# Patient Record
Sex: MALE | Race: WHITE
[De-identification: names, ages, dates, MRNs, and addresses within clinical notes are randomized per-mention and may not be internally consistent; named-entity substitution may affect disease eponyms.]

---

## 2018-02-23 ENCOUNTER — HOSPITAL ENCOUNTER (EMERGENCY)
Dept: HOSPITAL 54 - ER | Age: 23
Discharge: HOME | End: 2018-02-23
Payer: SELF-PAY

## 2018-02-23 VITALS — SYSTOLIC BLOOD PRESSURE: 132 MMHG | DIASTOLIC BLOOD PRESSURE: 84 MMHG

## 2018-02-23 VITALS — WEIGHT: 165 LBS | HEIGHT: 70 IN | BODY MASS INDEX: 23.62 KG/M2

## 2018-02-23 DIAGNOSIS — F15.10: Primary | ICD-10-CM

## 2018-02-23 LAB
ALBUMIN SERPL BCP-MCNC: 4.5 G/DL (ref 3.4–5)
ALP SERPL-CCNC: 98 U/L (ref 46–116)
ALT SERPL W P-5'-P-CCNC: 40 U/L (ref 12–78)
APAP SERPL-MCNC: < 2 UG/ML (ref 10–30)
AST SERPL W P-5'-P-CCNC: 33 U/L (ref 15–37)
BASOPHILS # BLD AUTO: 0 /CMM (ref 0–0.2)
BASOPHILS NFR BLD AUTO: 0.1 % (ref 0–2)
BILIRUB DIRECT SERPL-MCNC: 0.2 MG/DL (ref 0–0.2)
BILIRUB SERPL-MCNC: 0.7 MG/DL (ref 0.2–1)
BUN SERPL-MCNC: 12 MG/DL (ref 7–18)
CALCIUM SERPL-MCNC: 9.7 MG/DL (ref 8.5–10.1)
CHLORIDE SERPL-SCNC: 99 MMOL/L (ref 98–107)
CO2 SERPL-SCNC: 25 MMOL/L (ref 21–32)
CREAT SERPL-MCNC: 1.2 MG/DL (ref 0.6–1.3)
EOSINOPHIL # BLD AUTO: 0.1 /CMM (ref 0–0.7)
EOSINOPHIL NFR BLD AUTO: 0.6 % (ref 0–6)
ETHANOL SERPL-MCNC: < 3 MG/DL (ref 0–0)
GLUCOSE SERPL-MCNC: 101 MG/DL (ref 74–106)
HCT VFR BLD AUTO: 47 % (ref 39–51)
HGB BLD-MCNC: 16.4 G/DL (ref 13.5–17.5)
LYMPHOCYTES NFR BLD AUTO: 2.4 /CMM (ref 0.8–4.8)
LYMPHOCYTES NFR BLD AUTO: 22.7 % (ref 20–44)
MCH RBC QN AUTO: 31 PG (ref 26–33)
MCHC RBC AUTO-ENTMCNC: 35 G/DL (ref 31–36)
MCV RBC AUTO: 89 FL (ref 80–96)
MONOCYTES NFR BLD AUTO: 0.3 /CMM (ref 0.1–1.3)
MONOCYTES NFR BLD AUTO: 2.6 % (ref 2–12)
NEUTROPHILS # BLD AUTO: 7.7 /CMM (ref 1.8–8.9)
NEUTROPHILS NFR BLD AUTO: 74 % (ref 43–81)
PLATELET # BLD AUTO: 208 /CMM (ref 150–450)
POTASSIUM SERPL-SCNC: 3.2 MMOL/L (ref 3.5–5.1)
PROT SERPL-MCNC: 8.2 G/DL (ref 6.4–8.2)
RBC # BLD AUTO: 5.25 MIL/UL (ref 4.5–6)
RDW COEFFICIENT OF VARIATION: 12.5 (ref 11.5–15)
SALICYLATES SERPL-MCNC: 2.9 MG/DL (ref 2.8–20)
SODIUM SERPL-SCNC: 138 MMOL/L (ref 136–145)
WBC NRBC COR # BLD AUTO: 10.5 K/UL (ref 4.3–11)

## 2018-02-23 PROCEDURE — G0480 DRUG TEST DEF 1-7 CLASSES: HCPCS

## 2018-02-23 PROCEDURE — 80329 ANALGESICS NON-OPIOID 1 OR 2: CPT

## 2018-02-23 PROCEDURE — A4606 OXYGEN PROBE USED W OXIMETER: HCPCS

## 2018-02-23 PROCEDURE — Z7610: HCPCS

## 2018-02-23 PROCEDURE — 85025 COMPLETE CBC W/AUTO DIFF WBC: CPT

## 2018-02-23 PROCEDURE — 99284 EMERGENCY DEPT VISIT MOD MDM: CPT

## 2018-02-23 PROCEDURE — 36415 COLL VENOUS BLD VENIPUNCTURE: CPT

## 2018-02-23 PROCEDURE — 80048 BASIC METABOLIC PNL TOTAL CA: CPT

## 2018-02-23 PROCEDURE — 80076 HEPATIC FUNCTION PANEL: CPT

## 2018-02-23 NOTE — NUR
PT ALEX TO ER BED 12. HERE FOR AMS S/P METH USE. APPEARS ANXIOUS. STABLE 
VITALS AWAITING MD MORRIS.

## 2018-02-26 ENCOUNTER — HOSPITAL ENCOUNTER (INPATIENT)
Dept: HOSPITAL 54 - ER | Age: 23
LOS: 2 days | Discharge: LEFT BEFORE DISCHARGE | DRG: 917 | End: 2018-02-28
Attending: INTERNAL MEDICINE | Admitting: INTERNAL MEDICINE
Payer: SELF-PAY

## 2018-02-26 VITALS — HEIGHT: 69 IN | BODY MASS INDEX: 26.51 KG/M2 | WEIGHT: 179 LBS

## 2018-02-26 DIAGNOSIS — F14.10: ICD-10-CM
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
## 2018-02-26 DIAGNOSIS — F15.159: ICD-10-CM

## 2018-02-26 DIAGNOSIS — F12.10: ICD-10-CM

## 2018-02-26 DIAGNOSIS — F29: ICD-10-CM

## 2018-02-26 DIAGNOSIS — T50.991A: Primary | ICD-10-CM

## 2018-02-26 DIAGNOSIS — F13.10: ICD-10-CM

## 2018-02-26 DIAGNOSIS — G92: ICD-10-CM

## 2018-02-26 LAB
ALBUMIN SERPL BCP-MCNC: 3.9 G/DL (ref 3.4–5)
ALP SERPL-CCNC: 91 U/L (ref 46–116)
ALT SERPL W P-5'-P-CCNC: 34 U/L (ref 12–78)
APAP SERPL-MCNC: 0 UG/ML (ref 10–30)
AST SERPL W P-5'-P-CCNC: 25 U/L (ref 15–37)
BASOPHILS # BLD AUTO: 0 /CMM (ref 0–0.2)
BASOPHILS NFR BLD AUTO: 0.6 % (ref 0–2)
BILIRUB DIRECT SERPL-MCNC: 0.2 MG/DL (ref 0–0.2)
BILIRUB SERPL-MCNC: 1 MG/DL (ref 0.2–1)
BUN SERPL-MCNC: 12 MG/DL (ref 7–18)
CALCIUM SERPL-MCNC: 8.9 MG/DL (ref 8.5–10.1)
CHLORIDE SERPL-SCNC: 104 MMOL/L (ref 98–107)
CO2 SERPL-SCNC: 26 MMOL/L (ref 21–32)
CREAT SERPL-MCNC: 0.9 MG/DL (ref 0.6–1.3)
EOSINOPHIL # BLD AUTO: 0.1 /CMM (ref 0–0.7)
EOSINOPHIL NFR BLD AUTO: 1.5 % (ref 0–6)
ETHANOL SERPL-MCNC: < 3 MG/DL (ref 0–0)
GLUCOSE SERPL-MCNC: 85 MG/DL (ref 74–106)
HCT VFR BLD AUTO: 43 % (ref 39–51)
HGB BLD-MCNC: 15.2 G/DL (ref 13.5–17.5)
LYMPHOCYTES NFR BLD AUTO: 2.1 /CMM (ref 0.8–4.8)
LYMPHOCYTES NFR BLD AUTO: 40.6 % (ref 20–44)
MCH RBC QN AUTO: 32 PG (ref 26–33)
MCHC RBC AUTO-ENTMCNC: 35 G/DL (ref 31–36)
MCV RBC AUTO: 90 FL (ref 80–96)
MONOCYTES NFR BLD AUTO: 0.6 /CMM (ref 0.1–1.3)
MONOCYTES NFR BLD AUTO: 11.3 % (ref 2–12)
NEUTROPHILS # BLD AUTO: 2.3 /CMM (ref 1.8–8.9)
NEUTROPHILS NFR BLD AUTO: 46 % (ref 43–81)
PH UR STRIP: 7 [PH] (ref 5–8)
PLATELET # BLD AUTO: 169 /CMM (ref 150–450)
POTASSIUM SERPL-SCNC: 3.9 MMOL/L (ref 3.5–5.1)
PROT SERPL-MCNC: 7.1 G/DL (ref 6.4–8.2)
RBC # BLD AUTO: 4.82 MIL/UL (ref 4.5–6)
RBC #/AREA URNS HPF: (no result) /HPF (ref 0–2)
RDW COEFFICIENT OF VARIATION: 12.8 (ref 11.5–15)
SALICYLATES SERPL-MCNC: 2.4 MG/DL (ref 2.8–20)
SODIUM SERPL-SCNC: 141 MMOL/L (ref 136–145)
UROBILINOGEN UR STRIP-MCNC: 1 EU/DL
WBC #/AREA URNS HPF: (no result) /HPF (ref 0–3)
WBC NRBC COR # BLD AUTO: 5.1 K/UL (ref 4.3–11)

## 2018-02-26 PROCEDURE — Z7610: HCPCS

## 2018-02-26 PROCEDURE — A4606 OXYGEN PROBE USED W OXIMETER: HCPCS

## 2018-02-26 PROCEDURE — G0480 DRUG TEST DEF 1-7 CLASSES: HCPCS

## 2018-02-26 NOTE — NUR
PT BECOMING RESTLESSNESS, AND AGGRESSIVE TOWARDS THE STAFF. ZYPREXA 10MG GIVEN 
IM INSTEAD OF ZYPREXA PO.
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
## 2018-02-26 NOTE — NUR
contacted [facility] [place] [phone] and spoke with 
Veronique. HARPREET informed Veronique rainey's situation and gave his name and phone number.  
Veronique informed HARPREET the consulate is in a meeting but will have someone call HARPREET. HARPREET gave 
Veronique her contact number and told her to call [phone] after 4:3-PM and ask for 
the charge nurse. HARPREET [facility].  

-------------------------------------------------------------------------------

Addendum: 02/26/18 at 1547 by YAMILEX MULLINS

-------------------------------------------------------------------------------

*after 4:30PM

## 2018-02-26 NOTE — NUR
TIRT[address] FROM STRE[address]: BIZARRE BEHAVIOR S/P USING METHAMPHETAMINES, PT WAS SEEN 
BY MD, NO FURTHER ORDERS GIVEN.

## 2018-02-27 VITALS — DIASTOLIC BLOOD PRESSURE: 63 MMHG | SYSTOLIC BLOOD PRESSURE: 100 MMHG

## 2018-02-27 VITALS — DIASTOLIC BLOOD PRESSURE: 88 MMHG | SYSTOLIC BLOOD PRESSURE: 120 MMHG

## 2018-02-27 VITALS — DIASTOLIC BLOOD PRESSURE: 58 MMHG | SYSTOLIC BLOOD PRESSURE: 99 MMHG

## 2018-02-27 VITALS — DIASTOLIC BLOOD PRESSURE: 57 MMHG | SYSTOLIC BLOOD PRESSURE: 120 MMHG

## 2018-02-27 NOTE — NUR
MS RN NOTES



PATIENT RECEIVED RESTING INSIDE ROOM, SLEEPING IN AND OUT. VERBALLY RESPONSIVE AND RESPONDS 
TO VERBAL AND TACTILE STIMULI. MUMBLES AUDIBLE WORDS BUT IN MIXED ENGLISH AND FOREIGN 
LANGUAGE. PATIENT CALM AT THIS TIME. WILL CONTINUE TO MONITOR. BED LOCKED AND IN LOW 
POSITION. BILATERAL UPPER SIDE RAILS UP AND LOCKED. CALL LIGHT WITHIN EASY REACH.

## 2018-02-27 NOTE — NUR
MS RN NOTES



PATIENT RESTING INSIDE ROOM, SLEEPING BUT AROUSABLE THROUGH VERBAL AND TACTILE STIMULI, THEN 
WOULD GO BACK TO SLEEP. PATIENT BREATHING EVEN AND UNLABORED. NO SOB OR ACUTE DISTRESS NOTED 
AT THIS TIME. PATIENT AFEBRILE, SKIN DRY AND WARM TO TOUCH. NO CHANGES IN LOC NOTED AT THIS 
TIME. WILL CONTINUE TO MONITOR. BILATERAL UPPER SIDE RAILS UP AND LOCKED. BED LOCKED AND IN 
LOW POSITION. BED ALARM ON. WILL ENDORSE TO INCOMING SHIFT
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
## 2018-02-27 NOTE — NUR
ms/rn opening notes

RECEIVED PATIENT, ASLEEP, RESTING COMFORTABLY IN BED, RESPIRATIONS EVEN AND UNLABORED, WITH 
SITTER TO MONITOR BEHAVIOR, REFUSE IV RE INSERTION REPORTED BY AM RN, RECEIVED REPORT FROM 
AM RN FOR CESIA. CALL LIGHTS WITHIN REACH, BED IN LOCK POSITION. SKIN WARM TO TOUCH, WILL 
PROVIDE FLUID AND KEEP COMFORTABLE.
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
## 2018-02-27 NOTE — NUR
MS RN NOTES



[place] CONSUL REPRESENTATIVE, JET MARCUS, PRESENT AT UNIT. PATIENT SEEN. VERBALIZED 
THAT THEY WILL CONTACT PATIENT'S FAMILY IN [place] TO COME AND SEE PATIENT. WILL CONTINUE TO 
MONITOR

## 2018-02-27 NOTE — NUR
MS RN NOTES



PATIENT SLEEPING INSIDE ROOM, EASILY AROUSABLE THROUGH VERBAL AND TACTILE STIMULI. OFFERED 
IV INSERTION BUT PATIENT REFUSED. RISKS AND BENEFITS EXPLAINED BUT TO NO AVAIL, OFFERED X 3, 
PATIENT STRONGLY REFUSED. VERBALIZED HE DOES NOT NEED HYDRATION AS HE CAN DRINK WATER BY 
HIMSELF. RESPECTED PATIENT'S RIGHTS. WILL CONTINUE TO MONITOR
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
## 2018-02-27 NOTE — NUR
VITAL SIGNS UPDATED. PATIENT IS RESTING IN ER BED, NO DISTRESS NOTED, SKIN WARM 
AND DRY. WILL CONTINUE TO MONITOR. PATIENT IS ON CARDIAC MONITOR

## 2018-02-27 NOTE — NUR
HARPERET received a call back from Harsha Simons from the [place] [facility] [place] informing SW 
that he did get her message yesterday and spoke to Dr. Martínez last night. HARPREET explained 
pt's situation and Harsha was able to confirm pt's identity and inform HARPREET that he will come 
by at 1:30 PM today to meet with pt. and HARPREET. Med Surg 3 SIERRA Melvin was updated. 

-------------------------------------------------------------------------------

Addendum: 02/27/18 at 1143 by YAMILEX MULLINS

-------------------------------------------------------------------------------

Harsha Simons from [facility] can be reached at [phone].

## 2018-02-27 NOTE — NUR
RN CLOSING NOTES

PATIENT IS  SLEEPING IN BED, EASY TO AROUSE, ALERT AND ORIENTED X1. NO PAIN OR DISCOMFORT 
NOTED AT THIS TIME. RESPIRATIONS EVEN AND UNLABORED. VS  STABLE. NO SOB NOTED. IV ACCESS ON 
LEFT HAND PATENT AND INTACT, NO REDNESS OR INFILTRATION NOTED. BED IN LOW AND LOCKED 
POSITION, SIDE RAILS X2. CALL LIGHT WITHIN EASY REACH. WILL ENDORSE TO RN DAY SHIFT FOR CESIA.

## 2018-02-27 NOTE — NUR
report was given to chloe ESQUIVEL for MS admission. patient was transported to [facility] 
via wheelchair by EMT

## 2018-02-27 NOTE — NUR
RN OPENING NOTES

RECEIVED PATIENT FROM ER, ALERT AND ORIENTED X1. PATIENT IS RESTLESS AND AGITATED, UNABLE TO 
PROVIDE PAST MEDICAL HISTORY. S/P USING METHAMPHETAMINES. PATIENT IS FROM [place]. 
RESPIRATIONS EVEN AND UNLABORED. BS X4. VS  STABLE. NO SOB NOTED. IV ACCESS ON LEFT HAND 
PATENT AND INTACT, NO REDNESS OR INFILTRATION NOTED. BED IN LOW AND LOCKED POSITION, SIDE 
RAILS X2. CALL LIGHT WITHIN EASY REACH. WILL CONTINUE TO MONITOR AND ASSESS DURING THE 
SHIFT.

## 2018-02-27 NOTE — NUR
HARPREET met with [place] Consul Harsha Simons, charge SIERRA Melvin and pt's SIERRA Cesar by pt's bedside. 
Pt. is asleep at this time. Harsha informed HARPREET that he contacted [place] and pt. has a long 
psychiatric history and is on medications. Pt's family had called [employer] of [place] 
to inquire about pt's whereabouts since they had not heard from the pt. Pt. moved from 
[place] on Thursday with his medications but lost all his belongings plus passport. Harsha 
informed HARPREET he will call the pt's family and have them come by Thursday from [place] to take 
pt. back to [place]. Harsha suggested if pt. can be placed on a 5150 hold by tomorrow if he 
tries to leave against medical advice. Charge SIERRA Melvin informed HARPREET that she will speak with 
nursing supervisor Marycruz for a sitter bedside and if pt. tries to AMA will ask for crisis 
evaluation. Consul Harsha informed HARPREET he will have the pt's psychiatrist call SW or email 
pt's psychiatric diagnosis and list of medications. Social  Juliette Martínez 
was updated with the aforementioned information.

## 2018-02-27 NOTE — NUR
MS RN NOTES



PATIENT WITH C/O HEADACHE WITH LEVEL OF 7/10. REQUESTED FOR PAIN MEDICATION. EXPLAINED THAT 
PATIENT HAS NORCO PO PRN FOR  PAIN. GIVEN MEDICATION AS ORDERED PRN. WILL CONTINUE TO 
MONITOR

## 2018-02-28 VITALS — DIASTOLIC BLOOD PRESSURE: 73 MMHG | SYSTOLIC BLOOD PRESSURE: 111 MMHG

## 2018-02-28 NOTE — NUR
MS/RN NOTES'

PATIENT IN BED, ABLE TO SLEEP DURING THE NIGHT, AWAKENS AND REPORTED HUNGRY, PROVIDED SNACKS 
AND FLUID, TOLERATED FOOD AND ABLE TO URINATE , SUPERVISED. COOPERATIVE TO CARE AND 
VERBALIZED UNABLE TO RECALL WHAT HAD HAPPEN . WILL REORIENT AND MONITOR PATIENT.WILL ENDORSE 
TO AM RN FOR CESIA.

## 2018-02-28 NOTE — NUR
HARPREET received a call from Harsha Simons from the [place] [facility] informing HARPREET that 
he will come by to see the pt. today around 2:30PM.

## 2018-02-28 NOTE — NUR
RECEIVED VERBAL ORDER FROM DR. MARTE FOR PSYCH CONSULT. PER DR MARTE NO IV ACCESS IS 
OK. PHYSICIAN CONSULT ORDERED.

## 2018-02-28 NOTE — NUR
PATIENT LEFT THE UNIT AGAINST MEDICAL ADVICE. AMA FORM SIGNED. PATIENT LEFT THE UNIT IN 
STABLE CONDITION ACCOMPANIED BY FRIEND. PATIENT STATED HE IS GOING TO GO TO A COFFEE SHOP 
NEAR BY TO SIT DOWN AND THINK OF A PLAN FOR THE FUTURE. DENIED THOUGHTS OF HARMING 
SELF/OTHERS. NO IV ACCESS. ALL BELONGINGS ARE TAKEN BY THE PATIENT.

## 2018-02-28 NOTE — NUR
HARPREET met with pt. and [place] [employer] Harsha at bedside. Harsha spoke to pt. in 
Spanish. Pt. continues to decline going back to [place] and have his parents come to pick 
him up. Pt. wants to stay in the [place] despite of lack of supports.  According to Harsha, 
Consulate cannot violate HIPAA and talk to his parents without his consent. Parents are 
concerned about pt. Pt. lacks insight and is not open to any advise given to him. Pt. denies 
suicidal ideations. HARPREET and Harsha encouraged pt. to return to [place], however pt. insisted 
on staying in [place] When HARPREET inquired pt. about his drug use, pt. stated he has been using 
drugs since the age of 18 in [place]. Pt. is drug seeking and requested for Morphine from his 
RN Yajaira. Pt. was declined since has not prescription for Morphine. Pt. has opted to leave 
[place]. Pt. called a friend he met recently named Phyllis Mclean [phone]. Phyllis is coming to 
the hospital to assist pt. HARPREET gave pt. the following resources: List of homeless resources, 
shelters, mental health centers and list of [facility].  Pt. has a return ticket 
to [place] in two weeks. HARPREET, Harsha and Phyllis discussed pt's disposition and discharge plan 
and Harsha gave phyllis his contact information as well and informed her that the [place] 
[employer]ulate from [place] will contact her as well. 



No other social service needs are required at this time. HARPREET is available if needed.

## 2018-02-28 NOTE — NUR
MS RN OPENING NOTE

RECEIVED BEDSIDE SBAR REPORT ON THE PATIENT. PATIENT IS A/0 X2, ASLEEP, EASILY AWAKEN IN 
BED.  BED IS LOCKED, IN LOWEST POSITION, SIDE RAILS UP X3, BED ALARM IS ON. PATIENT IS 
AMBULATORY WITH STEADY GAIT, BUT PRESENTS AT RISK FOR FALL DUE TO AMS.  USES URINAL AND BRP 
WITH ASSISTANCE.  SKIN IS INTACT. DENIES PAIN/DISCOMFORT AT THIS TIME. ALL BELONGINGS WITHIN 
REACH. ALL NEEDS ARE MET. CALL LIGHT WITHIN REACH. KAREN CARUSO AT THE BEDSIDE.   EDUCATED TO 
CALL FOR ASSISTANCE USING THE CALL LIGHT. PATIENT VERBALIZED UNDERSTANDING. WILL CONTINUE TO 
ASSESS/MONITOR THROUGHOUT THE SHIFT.
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
